# Patient Record
(demographics unavailable — no encounter records)

---

## 2025-05-21 NOTE — ADDENDUM
[FreeTextEntry1] : Next Visit: PVR, Uroflow  Entered by Nicole Song, acting as scribe and chaperone for Dr. Jose D Littlejohn.   The documentation recorded by the scribe accurately reflects the service I personally performed and the decisions made by me.

## 2025-05-21 NOTE — ASSESSMENT
[FreeTextEntry1] : Patient presents with bothersome voiding symptoms. Physical exam revealed a mildly enlarged prostate, tender on palpation. He is voiding adequately with mild post void residual. His clinical picture is consistent with mild BPH and prostatitis; we will place him on a one week course of Bactrim. His urine was collected for urinalysis and urine culture; if positive, we will change to the appropriate course of antibiotics if necessary. His serum was sent for PSA determination. He will follow-up in 1 month to reevaluate. If his voiding symptoms have not improved the patient will be scheduled for formal evaluation with urodynamic studies, transrectal ultrasound, and cystoscopy for a better assessment of his lower urinary tract function and anatomy.

## 2025-05-21 NOTE — LETTER BODY
[Dear  ___] : Dear  [unfilled], [Consult Letter:] : I had the pleasure of evaluating your patient, [unfilled]. [Please see my note below.] : Please see my note below. [Consult Closing:] : Thank you very much for allowing me to participate in the care of this patient.  If you have any questions, please do not hesitate to contact me. [Sincerely,] : Sincerely, [FreeTextEntry3] : Jose D

## 2025-05-21 NOTE — HISTORY OF PRESENT ILLNESS
[FreeTextEntry1] : Patient is a 61 year old  male who presents with bothersome voiding symptoms, presenting 1.5 months ago. He is voiding with an adequate stream, with some hesitancy, dysuria, suprapubic pain, nocturia x 2-3. He denies gross hematuria, fever or flank pain. He reports vomiting & diarrhea with a prostate medication for an enlarged prostate in the past. He reports inadequate fluid intake. He reports frequency with increased fluid intake. He is sexually active; he denies pain with ejaculations.  He is not a smoker. CT on 5/24/22 revealed a 2mm nonobstructive left renal calculus and normal size prostate. PSA on 12/22/23 was 0.35ng/ml.  116

## 2025-05-21 NOTE — PHYSICAL EXAM
[Normal Appearance] : normal appearance [Well Groomed] : well groomed [General Appearance - In No Acute Distress] : no acute distress [Edema] : no peripheral edema [Respiration, Rhythm And Depth] : normal respiratory rhythm and effort [Exaggerated Use Of Accessory Muscles For Inspiration] : no accessory muscle use [Abdomen Soft] : soft [Abdomen Tenderness] : non-tender [Costovertebral Angle Tenderness] : no ~M costovertebral angle tenderness [Urinary Bladder Findings] : the bladder was normal on palpation [Normal Station and Gait] : the gait and station were normal for the patient's age [] : no rash [No Focal Deficits] : no focal deficits [Oriented To Time, Place, And Person] : oriented to person, place, and time [Affect] : the affect was normal [Mood] : the mood was normal [No Palpable Adenopathy] : no palpable adenopathy [Prostate Tenderness] : the prostate was not tender [No Prostate Nodules] : no prostate nodules [Penis Abnormality] : normal uncircumcised penis [Prostate Size ___ (0-4)] : prostate size [unfilled] (scale: 0-4) [Urethral Meatus] : meatus normal [Scrotum] : the scrotum was normal [de-identified] : prostate slightly tender to palpation

## 2025-06-23 NOTE — ASSESSMENT
[FreeTextEntry1] : Patient has recurrence of bothersome voiding symptoms (mostly at night); he is voiding adequately with mild post void residual. Physical exam revealed a mildly enlarged prostate. His clinical picture is consistent with chronic prostatitis and OAB. For his bothersome nocturia, we will start him on nightly tolterodine 2mg; we discussed potential side effects of dry mouth and constipation. We did discuss that it can take up to 6 weeks to experience the medications effects. The patient will also be scheduled for formal evaluation with urodynamic studies, transrectal ultrasound, and cystoscopy for a better assessment of his lower urinary tract function and anatomy.

## 2025-06-23 NOTE — LETTER BODY
[Dear  ___] : Dear  [unfilled], [Courtesy Letter:] : I had the pleasure of seeing your patient, [unfilled], in my office today. [Please see my note below.] : Please see my note below. [Consult Closing:] : Thank you very much for allowing me to participate in the care of this patient.  If you have any questions, please do not hesitate to contact me. [Sincerely,] : Sincerely, [FreeTextEntry3] : Jose D

## 2025-06-23 NOTE — ADDENDUM
[FreeTextEntry1] : Next Visit: PVR, Uroflow, TRUS & Cystoscopy  Entered by Nicole Song, acting as scribe and chaperone for Dr. Jose D Littlejohn.   The documentation recorded by the scribe accurately reflects the service I personally performed and the decisions made by me.

## 2025-06-23 NOTE — HISTORY OF PRESENT ILLNESS
[FreeTextEntry1] : Patient comes in with persistent bothersome voiding symptoms. He reported improved voiding symptoms with antibiotics but reports recurrence of his voiding symptoms 1 week after completing his course. He is voiding with a slow stream, nocturia x 2, no frequency, dysuria or hematuria. He reports pain at night with a full bladder. PSA on 5/20/25 was 0.39 ng/mL.